# Patient Record
Sex: FEMALE | URBAN - METROPOLITAN AREA
[De-identification: names, ages, dates, MRNs, and addresses within clinical notes are randomized per-mention and may not be internally consistent; named-entity substitution may affect disease eponyms.]

---

## 2024-09-20 ENCOUNTER — OFFICE VISIT (OUTPATIENT)
Dept: URBAN - METROPOLITAN AREA CLINIC 54 | Facility: CLINIC | Age: 24
End: 2024-09-20

## 2024-10-04 ENCOUNTER — OFFICE VISIT (OUTPATIENT)
Dept: URBAN - METROPOLITAN AREA CLINIC 124 | Facility: CLINIC | Age: 24
End: 2024-10-04
Payer: COMMERCIAL

## 2024-10-04 ENCOUNTER — DASHBOARD ENCOUNTERS (OUTPATIENT)
Age: 24
End: 2024-10-04

## 2024-10-04 ENCOUNTER — LAB OUTSIDE AN ENCOUNTER (OUTPATIENT)
Dept: URBAN - METROPOLITAN AREA CLINIC 124 | Facility: CLINIC | Age: 24
End: 2024-10-04

## 2024-10-04 VITALS
TEMPERATURE: 97.5 F | SYSTOLIC BLOOD PRESSURE: 129 MMHG | WEIGHT: 119.8 LBS | HEART RATE: 96 BPM | HEIGHT: 64 IN | BODY MASS INDEX: 20.45 KG/M2 | DIASTOLIC BLOOD PRESSURE: 90 MMHG

## 2024-10-04 DIAGNOSIS — R10.12 LEFT UPPER QUADRANT ABDOMINAL PAIN: ICD-10-CM

## 2024-10-04 DIAGNOSIS — Z87.19 HISTORY OF GASTRITIS: ICD-10-CM

## 2024-10-04 DIAGNOSIS — R12 HEARTBURN: ICD-10-CM

## 2024-10-04 PROCEDURE — 99204 OFFICE O/P NEW MOD 45 MIN: CPT | Performed by: INTERNAL MEDICINE

## 2024-10-04 RX ORDER — NORETHINDRONE ACETATE AND ETHINYL ESTRADIOL AND FERROUS FUMARATE 1MG-20(21)
TAKE 1 TABLET BY MOUTH DAILY KIT ORAL
Qty: 84 EACH | Refills: 1 | Status: ACTIVE | COMMUNITY

## 2024-10-04 NOTE — HPI-TODAY'S VISIT:
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 hx gastritis dx on EGD done 10 yrs ago. She had 2 EGDs, both done in [place]. She recently did a H Pylori BT which was negative ( done off PPI).  No known history of colon cancer / GI malignancies / IBD in first degree family members. Dull pain in [place], mostly after dinner. PCP did labs and were normal. no nsaid use. started omeprazole 20 mg and been using for 2 weeks. she stopped PPI and doesnt feel symptoms were worse. She cant tell whether PPI use helped. no melena. Mild nausea. No emesis. no dysphagia. Occasional heartburn and epigastric burning. She has external hemorrhoids, no rectal bleeding, regular bowels.

## 2024-10-09 ENCOUNTER — OFFICE VISIT (OUTPATIENT)
Dept: URBAN - METROPOLITAN AREA SURGERY CENTER 16 | Facility: SURGERY CENTER | Age: 24
End: 2024-10-09
Payer: COMMERCIAL

## 2024-10-09 ENCOUNTER — CLAIMS CREATED FROM THE CLAIM WINDOW (OUTPATIENT)
Dept: URBAN - METROPOLITAN AREA CLINIC 4 | Facility: CLINIC | Age: 24
End: 2024-10-09
Payer: COMMERCIAL

## 2024-10-09 DIAGNOSIS — R10.12 ABDOMINAL PAIN, LEFT UPPER QUADRANT: ICD-10-CM

## 2024-10-09 DIAGNOSIS — K31.89 GASTRIC FOVEOLAR HYPERPLASIA: ICD-10-CM

## 2024-10-09 DIAGNOSIS — K31.89 OTHER DISEASES OF STOMACH AND DUODENUM: ICD-10-CM

## 2024-10-09 DIAGNOSIS — K90.0 CELIAC DISEASE: ICD-10-CM

## 2024-10-09 DIAGNOSIS — K22.89 OTHER SPECIFIED DISEASE OF ESOPHAGUS: ICD-10-CM

## 2024-10-09 DIAGNOSIS — K64.9 HEMORRHOIDS, UNSPECIFIED HEMORRHOID TYPE: ICD-10-CM

## 2024-10-09 PROCEDURE — 00731 ANES UPR GI NDSC PX NOS: CPT | Performed by: NURSE ANESTHETIST, CERTIFIED REGISTERED

## 2024-10-09 PROCEDURE — 88305 TISSUE EXAM BY PATHOLOGIST: CPT | Performed by: PATHOLOGY

## 2024-10-09 PROCEDURE — 88312 SPECIAL STAINS GROUP 1: CPT | Performed by: PATHOLOGY

## 2024-10-09 PROCEDURE — 43239 EGD BIOPSY SINGLE/MULTIPLE: CPT | Performed by: INTERNAL MEDICINE

## 2024-10-09 RX ORDER — NORETHINDRONE ACETATE AND ETHINYL ESTRADIOL AND FERROUS FUMARATE 1MG-20(21)
TAKE 1 TABLET BY MOUTH DAILY KIT ORAL
Qty: 84 EACH | Refills: 1 | Status: ACTIVE | COMMUNITY

## 2024-10-16 ENCOUNTER — LAB OUTSIDE AN ENCOUNTER (OUTPATIENT)
Dept: URBAN - METROPOLITAN AREA CLINIC 25 | Facility: CLINIC | Age: 24
End: 2024-10-16

## 2024-10-16 ENCOUNTER — TELEPHONE ENCOUNTER (OUTPATIENT)
Dept: URBAN - METROPOLITAN AREA CLINIC 25 | Facility: CLINIC | Age: 24
End: 2024-10-16

## 2024-10-28 ENCOUNTER — TELEPHONE ENCOUNTER (OUTPATIENT)
Dept: URBAN - METROPOLITAN AREA CLINIC 25 | Facility: CLINIC | Age: 24
End: 2024-10-28

## 2024-12-06 ENCOUNTER — OFFICE VISIT (OUTPATIENT)
Dept: URBAN - METROPOLITAN AREA CLINIC 124 | Facility: CLINIC | Age: 24
End: 2024-12-06
Payer: COMMERCIAL

## 2024-12-06 VITALS
HEART RATE: 76 BPM | WEIGHT: 120.8 LBS | DIASTOLIC BLOOD PRESSURE: 76 MMHG | SYSTOLIC BLOOD PRESSURE: 114 MMHG | HEIGHT: 64 IN | BODY MASS INDEX: 20.62 KG/M2

## 2024-12-06 DIAGNOSIS — Z87.19 HISTORY OF GASTRITIS: ICD-10-CM

## 2024-12-06 DIAGNOSIS — R10.12 LEFT UPPER QUADRANT ABDOMINAL PAIN: ICD-10-CM

## 2024-12-06 DIAGNOSIS — R12 HEARTBURN: ICD-10-CM

## 2024-12-06 PROCEDURE — 99212 OFFICE O/P EST SF 10 MIN: CPT | Performed by: INTERNAL MEDICINE

## 2024-12-06 RX ORDER — NORETHINDRONE ACETATE AND ETHINYL ESTRADIOL AND FERROUS FUMARATE 1MG-20(21)
TAKE 1 TABLET BY MOUTH DAILY KIT ORAL
Qty: 84 EACH | Refills: 1 | Status: ACTIVE | COMMUNITY

## 2024-12-06 NOTE — HPI-OTHER HISTORIES
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 hx gastritis dx on EGD done 10 yrs ago. She had 2 EGDs, both done in [place]. She recently did a H Pylori BT which was negative ( done off PPI).  No known history of colon cancer / GI malignancies / IBD in first degree family members. Dull pain in [place], mostly after dinner. PCP did labs and were normal. no nsaid use. started omeprazole 20 mg and been using for 2 weeks. she stopped PPI and doesnt feel symptoms were worse. She cant tell whether PPI use helped. no melena. Mild nausea. No emesis. no dysphagia. Occasional heartburn and epigastric burning. She has external hemorrhoids, no rectal bleeding, regular bowels.

## 2024-12-06 NOTE — HPI-TODAY'S VISIT:
December 2024 visit: EGD in October 2024 revealed no abnormal findings.  Biopsies from stomach, small intestine and esophagus were benign. A subsequent CAT scan of the abdomen with IV contrast revealed no abnormal findings  stopped omeprazole. Gi symptoms are better. denies constipation. no rectal bleeding.